# Patient Record
(demographics unavailable — no encounter records)

---

## 2024-12-14 NOTE — HISTORY OF PRESENT ILLNESS
[FreeTextEntry1] : Dec 13, 2024   in person  PCP:  Dr. Elizabeth Russo          Surg:  Dr. Duval Police           Onc:  Dr. Keiry Titus at [facility]  for Hx breast cancer                   GI:  Dr. Genaro Maloney           Card: Dr. Arroyo            CC:  Thyroid nodule - US 9/8/21 sub cm colloid cyst         Severe osteopenia   - on letrazole since 2018                                                 on alendronate weekly since 2019 - now on "holiday" since about [id number] 12/9/2024  T scores:  spine not eligible,    FN -0.4, TH + 0.6, Radius 1/3 -3.0     Spine X-ray:  negative                                                     on calcium and vitamin D         2021:  A1c 6.0    3/23/23:  A1c 6.0  84 yo - eating less and has lost a few pounds.  - concerned b/o Hx of breast cancer . Also notes cycclical water weight gain with sluggishness associated with decrease in hair loss at that time.   Also has some muscle cramps at night.    Last week was at [facility] and told by Dr. Cuello who diagnosed arthritis  of the hips.  Advised physical therapy.    Labs at [place] from 1/8/2024 included TSH 2.78, 25 OH vit D 37; Hct 38.2    A friend of hers advised PM magnesium  for nocturnal cramps.       # Vit D as low as 17.6 in 2/2013.    As of 8/2022 up to 35.   She also takes calcium supplement.  #  Bone density hip 12/2022 excellent.   Forearm T -2.6; however, Z indicates she is above average for her age and     has no history of fracture.   No longer eligible for spine T - but X-ray spine shows no compressions  and previous T    was in good range     # A1c being monitored.    : : Constitutional:  Alert, well nourished, healthy appearance, no acute distress  Eyes:  No proptosis, no stare Thyroid: Pulmonary:  No respiratory distress, no accessory muscle use; normal rate and effort Cardiac:  Normal rate Vascular:  Endocrine:  No stigmata of Cushings Syndrome Musculoskeletal:  Normal gait, no involuntary movements Neurology:  No tremors Affect/Mood/Psych:  Oriented x 3; normal affect, normal insight/judgement, normal mood  . Impression:  Bone density in good range on "holiday" from alendronate - still on letrozole.   May need to go back on the alendronate in the future. She would like to follow up to opthalmology ROV in one year.     Mar 26, 2024    in person  PCP:  Dr. Elizabeth Russo          Surg:  Dr. Simin Jiménez           Onc:  Dr. Keiry Titus at [facility]  for Hx breast cancer                   GI:  Dr. Genaro Maloney           Card: Dr. Arroyo            CC:  Thyroid nodule - US 9/8/21 sub cm colloid cyst         Severe osteopenia   - on letrazole since 2018                                                 on alendronate weekly since 2019 - now on "holiday" since about [id number]                                                     on calcium and vitamin D         2021:  A1c 6.0    3/23/23:  A1c 6.0  83 yo - eating less and has lost a few pounds.  - concerned b/o Hx of breast cancer . Also notes cycclical water weight gain with sluggishness associated with decrease in hair loss at that time.   Also has some muscle cramps at night.    Last week was at [facility] and told by Dr. Cuello who diagnosed arthritis  of the hips.  Advised physical therapy.    Labs at [place] from 1/8/2024 included TSH 2.78, 25 OH vit D 37; Hct 38.2    A friend of hers advised PM magnesium  for nocturnal cramps.       # Vit D as low as 17.6 in 2/2013.    As of 8/2022 up to 35.   She also takes calcium supplement.  #  Bone density hip 12/2022 excellent.   Forearm T -2.6; however, Z indicates she is above average for her age and     has no history of fracture.   No longer eligible for spine T - but X-ray spine shows no compressions  and previous T    was in good range     # A1c being monitored.    Impression:  Doing very well. Plan:  Can be on holiday from alendronate until at least December 2024 bone density and ROV after that.     :    Jun 21, 2023     in person  PCP:  Dr. Elizabeth Russo          Surg:  Dr. Simin Jiménez           Onc:  Tacho at [facility]  for Hx breast cancer                   GI:  Dr. Genaro Maloney           Card: Dr. Arroyo            CC:  Thyroid nodule - US 9/8/21 sub cm colloid cyst         Severe osteopenia   - on letrazole since 2018                                                 on alendronate weekly since 2019 - now on "holiday"                                                   on calcium and vitamin D         2021:  A1c 6.0    3/23/23:  A1c 6.0  83 yo - eating less and has lost a few pounds.  - concerned b/o Hx of breast cancer . Also notes cycclical water weight gain with sluggishness associated with decrease in hair loss at that time.   Also has some muscle cramps at night.      # Vit D as low as 17.6 in 2/2013.    As of 8/2022 up to 35.   She also takes calcium suppplement.  #  Bone density hip 12/2022 excellent.   Forearm T -2.6; however, Z indicates she is above average for her age and     has no history of fracture.   No longer eligible for spine T - but X-ray spine shows no compressions  and previous T    was in good range     # A1c being monitored.   At [place] 3/2023 A1c 6.0 and by 8/2023 5.6%  : : Constitutional:  Alert, well nourished, healthy appearance, no acute distress  Eyes:  No proptosis, no stare Thyroid:  normal to palpation Pulmonary:  No respiratory distress, no accessory muscle use; normal rate and effort Cardiac:  Normal rate Vascular:  Endocrine:  No stigmata of Cushing's Syndrome Musculoskeletal:  Normal gait, no involuntary movements Neurology:  No tremors Affect/Mood/Psych:  Oriented x 3; normal affect, normal insight/judgement, normal mood  .  Imp:  Endocrine isssues all stable. Her chest discomfort resolved after she was evaluated and treated by GI. She is concerned about the interaction of her multiple medications and I did my best to reassure her. She is interested in a second opnion as to how long she should continue on the anti-estrogen and she will seek advice from the [facility] team.    ROV September.      Jan 06, 2023   in person                   #1 BD  ## adrenal rush feeling.    PCP:  Dr. Elizabeth Russo          Surg:  Dr. Simin Jiménez           Onc:  Tacho at [facility]  for Hx breast cancer                   CC:  Thyroid nodule         Severe osteopenia   - on letrazole since 2018                                                 on alendronate weekly since 2019                                                   on calcium and vitamin D         2021:  A1c 6.0  80 yo - eating less and has lost a few pounds.  - concerned b/o Hx of breast cancer . Also notes cycclical water weight gain with sluggishness associated with decrease in hair loss at that time.   Also has some muscle cramps at night.    : : Constitutional:  Alert, well nourished, healthy appearance, no acute distress  Eyes:  No proptosis, no stare Thyroid: Pulmonary:  No respiratory distress, no accessory muscle use; normal rate and effort Cardiac:  Normal rate Vascular:  Endocrine:  No stigmata of Cushing's Syndrome Musculoskeletal:  Normal gait, no involuntary movements Neurology:  No tremors Affect/Mood/Psych:  Oriented x 3; normal affect, normal insight/judgement, normal mood  .  Impression:  Recent bone density 12/8/2022 includes FN T -0.3, TH +0.9, FA -2.6  (but Z +0.8) Although spine no longer included, in 2019 L3 T = 0 and 12/8/22 X-ray spine:  no compressions. she has been on alendronate since 2019 and so she can go "on holiday"" as of today.     Went to [place] and had diarrhea which is resolving. Also has symptoms "like an adrenaline rush" jonnie after she eats, but also when she does not eat. Feels like there is a sponge in her stomach.   She will see GI for their advice.        May 23, 2022    in person     PCP:  Dr. Elizabeth Russo          Surg:  Dr. Duval Police           Onc:  Tacho at [facility]  for Hx breast cancer                   CC:  Thyroid nodule         Severe osteopenia   - on letrazole since 2018                                                 on alendronate weekly since 2019                                                   on calcium and vitamin D         2021:  A1c 6.0  80 yo - eating less and has lost a few pounds.  - concerned b/o Hx of breast cancer . Also notes cycclical water weight gain with sluggishness associated with decrease in hair loss at that time.   Also has some muscle cramps at night.    : : Constitutional:  Alert, well nourished, healthy appearance, no acute distress  Eyes:  No proptosis, no stare Thyroid: Pulmonary:  No respiratory distress, no accessory muscle use; normal rate and effort Cardiac:  Normal rate Vascular:  Endocrine:  No stigmata of Cushing's Syndrome Musculoskeletal:  Normal gait, no involuntary movements Neurology:  No tremors Affect/Mood/Psych:  Oriented x 3; normal affect, normal insight/judgement, normal mood  .    #  Sub Cm thyroid nodule        Advised f/u US thyroid ~ 9/2023  (initially detected by dental technician)       #  Osteopenia - she decided on alendronate     on  letrazole - told she may require indefinitely           She has been back on the alendronate for about 2 years.    If she prefers, she can go on "holiday"  as she indicates she                   may prefer.            Vit D goal is 30-40 and her last vit D was 36.   She will continue to monitor.          She is taking 1200 mg of calcium carbonate (she is lactose intolerant) and she read NYT article suggesting there                may be some risks with too much calcium, it would certain not be a problem if she cut back to 600 mg tabs daily.   #   Reactive hypoglycemia and prediabetes.based on A1c of 6.0%  -              She has long Hx of symptoms of reactive hypoglycemia and has strategies to deal with it.         Plan: Lower carbohydrate diet as she is doing                     She would like to wait tohave labs (A1c, vit D, etc, when she sees Dr. Russo next)      Nov 03, 2021   in person  (note:  has iPad)  PCP:  Dr. Elizabeth Russo          Surg:  Dr. Simin Jiménez           Onc:  Tacho at [facility]    CC:  Thyroid nodule         Severe osteopenia   - on letrazole since 2018                                                 on alendronate weekly since 2019                                                   on calcium and vitamin D         2021:  A1c 6.0  #  Sub Cm thyroid nodule        Will advise f/u US  #  Osteopenia - she decided on alendronate     on  letrazole           She has been back on the alendronate for about 2 years so she can certainly go back on holiday          Vit D goal is 30-40 and her last vit D was 36.   She will continue to monitor.          She is taking 1200 mg of calcium carbonate (she is lactose intolerant) and she read NYT article suggesting there                may be some risks with too much calcium, it would certain not be a problem if she cut back to 600 mg tabs daily.   #   Reactive hypoglycemia and prediabetes.based on A1c of 6.0%  -              She has long Hx of symptoms of reactive hypoglycemia and has strategies to deal with it.         Plan: Lower carbohydrate diet as she is doing                     Plan:  ROV six months.         May 12, 2021    in person  PCP:  Dr. Elizabeth Russo          Surg:  Dr. Simin Jiménez           Onc:  Tacho at [facility]    CC:  Thyroid nodule         Severe osteopenia   - on letrazole since 2018                                                 on alendronate weekly since 2019                                                   on calcium and vitamin D         2021:  A1c 6.0  #  Sub Cm thyroid nodule        Will advise f/u US  #  Osteopenia - she decided on alendronate    #   Reactive hypoglycemia and prediabetes.         Plan: Lower carbohydrate diet          ROV in 3 months.       August 12, 2019  PCP:  Dr. Elizabeth Russo          Surg:  Dr. Simin Jiménez  CC:  Thyroid nodule         Severe osteopenia   77 yo started on anti-estrogen.  Bone density report included forearm which shows severe osteopenia, increased risk of fracture.    Forearm likely included b/o spine was only able to include L3. Oncology at [facility] advised monthly oral Boniva. She is noting more joint pain, particularly in her hands. She has been advised this is likely an effect of the anti-estrogen, but she is concerned it might also be related to the Boniva.  Impression/Plan:   Reviewed National Osteoporosis Foundation Guidelines and the  Oncology Guidelines of 2017 for individuals treated with anti-estrogen. European Guidelines advise antiresorptive if spine or hip T lower than -2.0 or if 2 or more additional risk factors.  Plan:  It seems reasonable to me that she repeat the bone density in one year as suggested in the guidelines.  While the bisphosphonate is likely not the likely culprit for her symptoms, I would certainly have no objection to going on a long holiday from the Boniva to see how that turns out, as her spine and hip bone density are excellent and the forearm only shows osteopenia and a Z score above average.    Can defer the follow up ultrasound of the thyroid for several months. Reassured her that in general her bone strength is excellent and gave her copies of the graphical data and  guidelines.    She will review with her oncologist and I will defer to her.    January 3, 2018  PCP:  Dr. Elizabeth Russo          Surg:  Dr. 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   Her youngest daughter takes thyroid medication.    Summary:  April 8/2013   [facility] There is a 3 mm sized hypoechoic nodudle involving the upper pole of the left lobe.    12/17/2018  [place]   A left upper pole cystic nodule measures 3.9 x 2.8 x 3.3 mm  oerfirned after her dentist noted  December 3, 2013  bone denisty at [place] - excellent   Impression:  Ultrasound is very reassuring. Follow up ultrasound in about 6 months would be prudent.

## 2024-12-14 NOTE — HISTORY OF PRESENT ILLNESS
[FreeTextEntry1] : Dec 13, 2024   in person  PCP:  Dr. Elizabeth Russo          Surg:  Dr. Duval Police           Onc:  Dr. Keiry Titus at [facility]  for Hx breast cancer                   GI:  Dr. Genaro Maloney           Card: Dr. Arroyo            CC:  Thyroid nodule - US 9/8/21 sub cm colloid cyst         Severe osteopenia   - on letrazole since 2018                                                 on alendronate weekly since 2019 - now on "holiday" since about [id number] 12/9/2024  T scores:  spine not eligible,    FN -0.4, TH + 0.6, Radius 1/3 -3.0     Spine X-ray:  negative                                                     on calcium and vitamin D         2021:  A1c 6.0    3/23/23:  A1c 6.0  84 yo - eating less and has lost a few pounds.  - concerned b/o Hx of breast cancer . Also notes cycclical water weight gain with sluggishness associated with decrease in hair loss at that time.   Also has some muscle cramps at night.    Last week was at [facility] and told by Dr. Cuello who diagnosed arthritis  of the hips.  Advised physical therapy.    Labs at [facility] from 1/8/2024 included TSH 2.78, 25 OH vit D 37; Hct 38.2    A friend of hers advised PM magnesium  for nocturnal cramps.       # Vit D as low as 17.6 in 2/2013.    As of 8/2022 up to 35.   She also takes calcium supplement.  #  Bone density hip 12/2022 excellent.   Forearm T -2.6; however, Z indicates she is above average for her age and     has no history of fracture.   No longer eligible for spine T - but X-ray spine shows no compressions  and previous T    was in good range     # A1c being monitored.    : : Constitutional:  Alert, well nourished, healthy appearance, no acute distress  Eyes:  No proptosis, no stare Thyroid: Pulmonary:  No respiratory distress, no accessory muscle use; normal rate and effort Cardiac:  Normal rate Vascular:  Endocrine:  No stigmata of Cushings Syndrome Musculoskeletal:  Normal gait, no involuntary movements Neurology:  No tremors Affect/Mood/Psych:  Oriented x 3; normal affect, normal insight/judgement, normal mood  . Impression:  Bone density in good range on "holiday" from alendronate - still on letrozole.   May need to go back on the alendronate in the future. She would like to follow up to opthalmology ROV in one year.     Mar 26, 2024    in person  PCP:  Dr. Elizabeth Russo          Surg:  Dr. Simin Jiménez           Onc:  Dr. Keiry Titus at [facility]  for Hx breast cancer                   GI:  Dr. Genaro Maloney           Card: Dr. Arroyo            CC:  Thyroid nodule - US 9/8/21 sub cm colloid cyst         Severe osteopenia   - on letrazole since 2018                                                 on alendronate weekly since 2019 - now on "holiday" since about [id number]                                                     on calcium and vitamin D         2021:  A1c 6.0    3/23/23:  A1c 6.0  81 yo - eating less and has lost a few pounds.  - concerned b/o Hx of breast cancer . Also notes cycclical water weight gain with sluggishness associated with decrease in hair loss at that time.   Also has some muscle cramps at night.    Last week was at [facility] and told by Dr. Cuello who diagnosed arthritis  of the hips.  Advised physical therapy.    Labs at [facility] from 1/8/2024 included TSH 2.78, 25 OH vit D 37; Hct 38.2    A friend of hers advised PM magnesium  for nocturnal cramps.       # Vit D as low as 17.6 in 2/2013.    As of 8/2022 up to 35.   She also takes calcium supplement.  #  Bone density hip 12/2022 excellent.   Forearm T -2.6; however, Z indicates she is above average for her age and     has no history of fracture.   No longer eligible for spine T - but X-ray spine shows no compressions  and previous T    was in good range     # A1c being monitored.    Impression:  Doing very well. Plan:  Can be on holiday from alendronate until at least December 2024 bone density and ROV after that.     :    Jun 21, 2023     in person  PCP:  Dr. Elizabeth Russo          Surg:  Dr. Simin Jiménez           Onc:  Tacho at [facility]  for Hx breast cancer                   GI:  Dr. Genaro Maloney           Card: Dr. Arroyo            CC:  Thyroid nodule - US 9/8/21 sub cm colloid cyst         Severe osteopenia   - on letrazole since 2018                                                 on alendronate weekly since 2019 - now on "holiday"                                                   on calcium and vitamin D         2021:  A1c 6.0    3/23/23:  A1c 6.0  81 yo - eating less and has lost a few pounds.  - concerned b/o Hx of breast cancer . Also notes cycclical water weight gain with sluggishness associated with decrease in hair loss at that time.   Also has some muscle cramps at night.      # Vit D as low as 17.6 in 2/2013.    As of 8/2022 up to 35.   She also takes calcium suppplement.  #  Bone density hip 12/2022 excellent.   Forearm T -2.6; however, Z indicates she is above average for her age and     has no history of fracture.   No longer eligible for spine T - but X-ray spine shows no compressions  and previous T    was in good range     # A1c being monitored.   At [facility] 3/2023 A1c 6.0 and by 8/2023 5.6%  : : Constitutional:  Alert, well nourished, healthy appearance, no acute distress  Eyes:  No proptosis, no stare Thyroid:  normal to palpation Pulmonary:  No respiratory distress, no accessory muscle use; normal rate and effort Cardiac:  Normal rate Vascular:  Endocrine:  No stigmata of Cushing's Syndrome Musculoskeletal:  Normal gait, no involuntary movements Neurology:  No tremors Affect/Mood/Psych:  Oriented x 3; normal affect, normal insight/judgement, normal mood  .  Imp:  Endocrine isssues all stable. Her chest discomfort resolved after she was evaluated and treated by GI. She is concerned about the interaction of her multiple medications and I did my best to reassure her. She is interested in a second opnion as to how long she should continue on the anti-estrogen and she will seek advice from the [facility] team.    ROV September.      Jan 06, 2023   in person                   #1 BD  ## adrenal rush feeling.    PCP:  Dr. Elizabeth Russo          Surg:  Dr. Simin Jiménez           Onc:  Tacho at [facility]  for Hx breast cancer                   CC:  Thyroid nodule         Severe osteopenia   - on letrazole since 2018                                                 on alendronate weekly since 2019                                                   on calcium and vitamin D         2021:  A1c 6.0  82 yo - eating less and has lost a few pounds.  - concerned b/o Hx of breast cancer . Also notes cycclical water weight gain with sluggishness associated with decrease in hair loss at that time.   Also has some muscle cramps at night.    : : Constitutional:  Alert, well nourished, healthy appearance, no acute distress  Eyes:  No proptosis, no stare Thyroid: Pulmonary:  No respiratory distress, no accessory muscle use; normal rate and effort Cardiac:  Normal rate Vascular:  Endocrine:  No stigmata of Cushing's Syndrome Musculoskeletal:  Normal gait, no involuntary movements Neurology:  No tremors Affect/Mood/Psych:  Oriented x 3; normal affect, normal insight/judgement, normal mood  .  Impression:  Recent bone density 12/8/2022 includes FN T -0.3, TH +0.9, FA -2.6  (but Z +0.8) Although spine no longer included, in 2019 L3 T = 0 and 12/8/22 X-ray spine:  no compressions. she has been on alendronate since 2019 and so she can go "on holiday"" as of today.     Went to [place] and had diarrhea which is resolving. Also has symptoms "like an adrenaline rush" jonnie after she eats, but also when she does not eat. Feels like there is a sponge in her stomach.   She will see GI for their advice.        May 23, 2022    in person     PCP:  Dr. Elizabeth Russo          Surg:  Dr. Duval Police           Onc:  Tacho at [facility]  for Hx breast cancer                   CC:  Thyroid nodule         Severe osteopenia   - on letrazole since 2018                                                 on alendronate weekly since 2019                                                   on calcium and vitamin D         2021:  A1c 6.0  82 yo - eating less and has lost a few pounds.  - concerned b/o Hx of breast cancer . Also notes cycclical water weight gain with sluggishness associated with decrease in hair loss at that time.   Also has some muscle cramps at night.    : : Constitutional:  Alert, well nourished, healthy appearance, no acute distress  Eyes:  No proptosis, no stare Thyroid: Pulmonary:  No respiratory distress, no accessory muscle use; normal rate and effort Cardiac:  Normal rate Vascular:  Endocrine:  No stigmata of Cushing's Syndrome Musculoskeletal:  Normal gait, no involuntary movements Neurology:  No tremors Affect/Mood/Psych:  Oriented x 3; normal affect, normal insight/judgement, normal mood  .    #  Sub Cm thyroid nodule        Advised f/u US thyroid ~ 9/2023  (initially detected by dental technician)       #  Osteopenia - she decided on alendronate     on  letrazole - told she may require indefinitely           She has been back on the alendronate for about 2 years.    If she prefers, she can go on "holiday"  as she indicates she                   may prefer.            Vit D goal is 30-40 and her last vit D was 36.   She will continue to monitor.          She is taking 1200 mg of calcium carbonate (she is lactose intolerant) and she read NYT article suggesting there                may be some risks with too much calcium, it would certain not be a problem if she cut back to 600 mg tabs daily.   #   Reactive hypoglycemia and prediabetes.based on A1c of 6.0%  -              She has long Hx of symptoms of reactive hypoglycemia and has strategies to deal with it.         Plan: Lower carbohydrate diet as she is doing                     She would like to wait tohave labs (A1c, vit D, etc, when she sees Dr. Russo next)      Nov 03, 2021   in person  (note:  has iPad)  PCP:  Dr. Elizabeth Russo          Surg:  Dr. Simin Jiménez           Onc:  Tacho at [facility]    CC:  Thyroid nodule         Severe osteopenia   - on letrazole since 2018                                                 on alendronate weekly since 2019                                                   on calcium and vitamin D         2021:  A1c 6.0  #  Sub Cm thyroid nodule        Will advise f/u US  #  Osteopenia - she decided on alendronate     on  letrazole           She has been back on the alendronate for about 2 years so she can certainly go back on holiday          Vit D goal is 30-40 and her last vit D was 36.   She will continue to monitor.          She is taking 1200 mg of calcium carbonate (she is lactose intolerant) and she read NYT article suggesting there                may be some risks with too much calcium, it would certain not be a problem if she cut back to 600 mg tabs daily.   #   Reactive hypoglycemia and prediabetes.based on A1c of 6.0%  -              She has long Hx of symptoms of reactive hypoglycemia and has strategies to deal with it.         Plan: Lower carbohydrate diet as she is doing                     Plan:  ROV six months.         May 12, 2021    in person  PCP:  Dr. Elizabeth Russo          Surg:  Dr. Simin Jiménez           Onc:  Tacho at [facility]    CC:  Thyroid nodule         Severe osteopenia   - on letrazole since 2018                                                 on alendronate weekly since 2019                                                   on calcium and vitamin D         2021:  A1c 6.0  #  Sub Cm thyroid nodule        Will advise f/u US  #  Osteopenia - she decided on alendronate    #   Reactive hypoglycemia and prediabetes.         Plan: Lower carbohydrate diet          ROV in 3 months.       August 12, 2019  PCP:  Dr. Elizabeth Russo          Surg:  Dr. Simin Jiménez  CC:  Thyroid nodule         Severe osteopenia   77 yo started on anti-estrogen.  Bone density report included forearm which shows severe osteopenia, increased risk of fracture.    Forearm likely included b/o spine was only able to include L3. Oncology at [facility] advised monthly oral Boniva. She is noting more joint pain, particularly in her hands. She has been advised this is likely an effect of the anti-estrogen, but she is concerned it might also be related to the Boniva.  Impression/Plan:   Reviewed National Osteoporosis Foundation Guidelines and the  Oncology Guidelines of 2017 for individuals treated with anti-estrogen. European Guidelines advise antiresorptive if spine or hip T lower than -2.0 or if 2 or more additional risk factors.  Plan:  It seems reasonable to me that she repeat the bone density in one year as suggested in the guidelines.  While the bisphosphonate is likely not the likely culprit for her symptoms, I would certainly have no objection to going on a long holiday from the Boniva to see how that turns out, as her spine and hip bone density are excellent and the forearm only shows osteopenia and a Z score above average.    Can defer the follow up ultrasound of the thyroid for several months. Reassured her that in general her bone strength is excellent and gave her copies of the graphical data and  guidelines.    She will review with her oncologist and I will defer to her.    January 3, 2018  PCP:  Dr. Elizabeth Russo          Surg:  Dr. 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   Her youngest daughter takes thyroid medication.    Summary:  April 8/2013   NSLI There is a 3 mm sized hypoechoic nodudle involving the upper pole of the left lobe.    12/17/2018  [facility]   A left upper pole cystic nodule measures 3.9 x 2.8 x 3.3 mm  oerfirned after her dentist noted  December 3, 2013  bone denisty at [facility] - excellent   Impression:  Ultrasound is very reassuring. Follow up ultrasound in about 6 months would be prudent.

## 2025-01-08 NOTE — PHYSICAL EXAM
[Normal S1, S2] : normal S1 and S2 [Murmurs] : no murmurs [Heart Rate And Rhythm] : heart rate was normal and rhythm regular [Edema] : edema was not present [Abdomen Tenderness] : non-tender [No Masses] : no abdominal mass palpated [Abdomen Soft] : soft [No Hernias] : no hernia was discovered [Normal] : no focal deficits [de-identified] : rt mastctomy - with errythema- skin [de-identified] : keratoses

## 2025-01-08 NOTE — HISTORY OF PRESENT ILLNESS
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 urogyn scheduled ct scan with contrast  she declined now    scheduled for cystoscopy and ultrasound kidneys and bladder  seeing Dr hall suggested letrozole for 10 years  5/21/24 pt has infection in eye - wonders if related to ear oflloxin tobramycin and dexamethasone does swim in pool has antibiotic drops for rt eye topical steroids  march infected toe nails  saw   dr baldwin - getting another     endoscopy in July on omeprazole  saw dr lopez - on amlodpine 110 mg  7/30/24 saw Dr hall - yesterday - no changes made signatera  - neg test for cancer labs were normal had f/up barretts esphagus - just had endoscopy neg biopies omeprazoled qod  covid in June no paxlovid mild  ears and eyes clear   1/8/25 pt had flu and covid vaccines off omperprazole  had labs with dr hellerman vit d 26/3 - stopped vit D - will restart labs otherwise normal  notes edema ankles  end of day supp hose helps not now

## 2025-01-08 NOTE — ASSESSMENT
[FreeTextEntry1] : cont norvasc for hypertension - under control  w/up neuropathy neg incl B12, B1, Immunofixation hba1c 5.9 - pt aware of prediabetes  breast continue as above with femara  ordered us of thyroid  10/15/19 back on boniva - holding it for a month did not help bone pains gave prevnar left arm reviewed labs gave copy ekg normal covid test  20 prevnar given - [id number]  DT  needs second shingles vaccines      is considering fosamax instead increased vit D to two tabs off dairy venous insuff - supp hose checking vit already had flu vaccine 45 mins counseling re above  10/27/20 advance directives reviewed dexa labs reviewed had a mammo had flu vaccine got pneuominia this summer and both   21 pt has some chest pain - vitals normal it is reproducible ekg completely [place] will get chest xray suspect costochondirits will try advil  8/3/21 left thigh pain - will get xray cont PT and advil - likely muscle strain  getting US thryoid f/up mammo  DTP  22 will get chest ct to f/up 6 mmLLL nodule - seen incidently pt notes increased SOB on exertion - however has stopped gym   of lung ca and she is worried no cough, no sputum  labs - reviewed - copy given to pt all wnl   22 nodule stable one year labs are all normal neuropsych tests were normal feels less stamina order mammo GYN appears normal  23  unusual abd discomfort  - seeing dr barker to get abd CT and endoscopy weight is stable  cardiology - some unusual left cheset discomfort saw Dr Diana echo wnl getting EST   had pain in calves after walking a lot in [place] - better with PT  wants new onc  - to avoid trips to [place] given names  sty in left eye - getting better with soaks sees Dr Barroso now for glaucome  anxious re possibly moving from apt anxious re computer - hard to see all the notes referred her to help desk for pt access to notes  24 ear  clearned of cezar audiologist referral  interested in test for lactose intolerance  stools irreg - diet has changed   has seen GI for endoscopy has seen    onc f/up breast ca  BP is good  25 minimal LE edema - reassured lipids good  probs with speech she notesproblems with words with Ts and Ss askes for speech therapy  optho f/up  derm

## 2025-02-06 NOTE — ASSESSMENT
[FreeTextEntry1] : # multifocal infiltrating lobular carcinoma range 0.15-5.2cm moderately differentiated grade 6/9 ER 90% MS 10% HER2 0. 0.5cm focus of infiltrating ductal carcinoma well differentiated 4/9 ER 95%, MS 5% HER 2 negative 6/38 LN + extracapsular extension no LVI LCIS classic type no DCIS ADH present  s/p R mastectomy and ALND Patient was recommended for chemo and declined and then started following at [facility] Natasha Leos  s/p adjuvant RT  Started Letrozole 9/2018 At this point I do not know if there is any benefit to starting verzenio and she would like to come off medication not add to it.  Currently asymptomatic except for SI joint pain which comes and goes - she will be seeing ortho for this - [facility] bone and joint. If no imaging is obtained and this persists will check imaging. ALKP wnl. seeing physiatrist now  signaterra negative in 2/24 and 7/2024 (confirmed with signaterra rep Doreen)  Advised to continue letrozole for 10 years. If able to tolerate and bones allow.  she was previously on bisphosphonate therapy and stopped per endocrinologist. h/o R mastectomy - s/p L side mammo [id number] BIRADS 2 - has small L axillary LN check US  #osteoporosis start of it in the forearm follows with Dr. Hellerman will resume prolia/zometa if next bone density worsens.  12/24 - Dexa and was told that she did not need to make any changes, saw Dr. Hellerman 12/2024   #hair thinning tsh wnl, vitD wnl check iron, ferritin  RTC in 6 months with cbc with diff, cmp, mag, phos, LDH, ESR/CRP, iron, ferritin, signaterra

## 2025-02-06 NOTE — ASSESSMENT
[FreeTextEntry1] : # multifocal infiltrating lobular carcinoma range 0.15-5.2cm moderately differentiated grade 6/9 ER 90% TN 10% HER2 0. 0.5cm focus of infiltrating ductal carcinoma well differentiated 4/9 ER 95%, TN 5% HER 2 negative 6/38 LN + extracapsular extension no LVI LCIS classic type no DCIS ADH present  s/p R mastectomy and ALND Patient was recommended for chemo and declined and then started following at [facility] Natasha Leos  s/p adjuvant RT  Started Letrozole 9/2018 At this point I do not know if there is any benefit to starting verzenio and she would like to come off medication not add to it.  Currently asymptomatic except for SI joint pain which comes and goes - she will be seeing ortho for this - [facility] bone [facility] joint. If no imaging is obtained and this persists will check imaging. ALKP wnl. seeing physiatrist now  signaterra negative in 2/24 and 7/2024 (confirmed with signaterra rep Doreen)  Advised to continue letrozole for 10 years. If able to tolerate and bones allow.  she was previously on bisphosphonate therapy and stopped per endocrinologist. h/o R mastectomy - s/p L side mammo 10/[id number] BIRADS 2 - has small L axillary LN check US  #osteoporosis start of it in the forearm follows with Dr. Hellerman will resume prolia/zometa if next bone density worsens.  12/24 - Dexa and was told that she did not need to make any changes, saw Dr. Hellerman 12/2024   #hair thinning tsh wnl, vitD wnl check iron, ferritin  RTC in 6 months with cbc with diff, cmp, mag, phos, LDH, ESR/CRP, iron, ferritin, signaterra

## 2025-02-06 NOTE — PHYSICAL EXAM
[Fully active, able to carry on all pre-disease performance without restriction] : Status 0 - Fully active, able to carry on all pre-disease performance without restriction [Normal] : affect appropriate [de-identified] : R mastectomy, no masses small L axillary LN

## 2025-02-06 NOTE — CONSULT LETTER
[Dear  ___] : Dear  [unfilled], [Consult Letter:] : I had the pleasure of evaluating your patient, [unfilled]. [Please see my note below.] : Please see my note below. [Consult Closing:] : Thank you very much for allowing me to participate in the care of this patient.  If you have any questions, please do not hesitate to contact me. [Sincerely,] : Sincerely, [FreeTextEntry3] : Keiry Nagel DO, FACMAYCOL, FACP Medical Oncology and Hematology [facility]

## 2025-02-06 NOTE — PHYSICAL EXAM
[Fully active, able to carry on all pre-disease performance without restriction] : Status 0 - Fully active, able to carry on all pre-disease performance without restriction [Normal] : affect appropriate [de-identified] : R mastectomy, no masses small L axillary LN

## 2025-02-06 NOTE — HISTORY OF PRESENT ILLNESS
[de-identified] : Ms. Brooks is an 83 y/o Ashkenazi female with a PMhx of R breast cancer s/p mastectomy, HTN, asthma, GERD, HLD, that presents for initial consultation as transfer of care from Dr. Titus at [facility].   Oncological History:   She was initially seen here by Dr. Jiménez following abnormal mammo and complaints of shrinking R breast   She noticed A shrinking right breast for 2 years with her right nipple turning downwards.   She had a recent mammogram and US that showed a 4.5 cm architectural distortion that was biopsied and found to be ILC ER70% PR40% [id number] neg and Ki67 <10%.   2018 s/p R mastectomy with R axillary dissection at [facility] with Dr. Ethan Key revealing multifocal infiltrating lobular carcinoma range 0.15-5.2cm moderately differentiated grade 6/ ER 90% VA 10% HER2 0. 0.5cm focus of infiltrating ductal carcinoma well differentiated  ER 95%, VA 5% HER 2 negative  LN + extracapsular extension no LVI LCIS classic type no DCIS ADH present   Patient was recommended for chemo and declined and then started following at [facility] Dr. Leos   s/p adjuvant RT   Started Letrozole 2018  s/p L sided screening mammo 10/2023 BIRADS 2   EGD Dr. Maloney    Allergies  None   Meds  Timolol eye drops  letrozole  amlodipine ASA 81  Atorvastatin  Omeprazole Citracal  Biotin  Vitamin d   FamHx  Alzhemiers Mother  DM Father, PGF Cancer MGM  Breast Cancer daughter   Social Hx  Former smoker smoked for 10 years less than 1ppd quit   no ETOH  Retired professor   Breast Cancer Risk Factors: Menarche: does not recall Date of LMP: does not recall Menopause: does not recall  Age at first live birth: 29 Nursed: yes Hysterectomy: no Oophorectomy: no OCP: yes  HRT: yes  Last pap/pelvic exam: never abnl pap Dr. Mcdonald  Related family history daughter breast ca at age 43 Ashkenazi: yes BRCA testing: negative states she was tested in 2018  [de-identified] : patient seen and examined for routine follow up. Continues to follow with Dr. Russo. Saw Dr. Leone 8/2024. s/p breast imaging 10/2024 BIRADS 2. [facility] 7/2024 negative (confirmed with [facility] rep). Remains on letrozole. Seeing physiatrist for knee arthritis.

## 2025-02-06 NOTE — HISTORY OF PRESENT ILLNESS
[de-identified] : Ms. Brooks is an 81 y/o Ashkenazi female with a PMhx of R breast cancer s/p mastectomy, HTN, asthma, GERD, HLD, that presents for initial consultation as transfer of care from Dr. Titus at [facility].   Oncological History:   She was initially seen here by Dr. Jiménez following abnormal mammo and complaints of shrinking R breast   She noticed A shrinking right breast for 2 years with her right nipple turning downwards.   She had a recent mammogram and US that showed a 4.5 cm architectural distortion that was biopsied and found to be ILC ER70% PR40% [id number] neg and Ki67 <10%.   2018 s/p R mastectomy with R axillary dissection at [facility] with Dr. Ethan Key revealing multifocal infiltrating lobular carcinoma range 0.15-5.2cm moderately differentiated grade 6/ ER 90% IA 10% HER2 0. 0.5cm focus of infiltrating ductal carcinoma well differentiated  ER 95%, IA 5% HER 2 negative  LN + extracapsular extension no LVI LCIS classic type no DCIS ADH present   Patient was recommended for chemo and declined and then started following at [facility] Dr. Leos   s/p adjuvant RT   Started Letrozole 2018  s/p L sided screening mammo 10/2023 BIRADS 2   EGD Dr. Maloney    Allergies  None   Meds  Timolol eye drops  letrozole  amlodipine ASA 81  Atorvastatin  Omeprazole Citracal  Biotin  Vitamin d   FamHx  Alzhemiers Mother  DM Father, PGF Cancer MGM  Breast Cancer daughter   Social Hx  Former smoker smoked for 10 years less than 1ppd quit   no ETOH  Retired professor   Breast Cancer Risk Factors: Menarche: does not recall Date of LMP: does not recall Menopause: does not recall  Age at first live birth: 29 Nursed: yes Hysterectomy: no Oophorectomy: no OCP: yes  HRT: yes  Last pap/pelvic exam: never abnl pap Dr. Mcdonald  Related family history daughter breast ca at age 43 Ashkenazi: yes BRCA testing: negative states she was tested in 2018  [de-identified] : patient seen and examined for routine follow up. Continues to follow with Dr. Russo. Saw Dr. Leone 8/2024. s/p breast imaging 10/2024 BIRADS 2. [facility] 7/2024 negative (confirmed with [facility] rep). Remains on letrozole. Seeing physiatrist for knee arthritis.

## 2025-02-06 NOTE — CONSULT LETTER
[Dear  ___] : Dear  [unfilled], [Consult Letter:] : I had the pleasure of evaluating your patient, [unfilled]. [Please see my note below.] : Please see my note below. [Consult Closing:] : Thank you very much for allowing me to participate in the care of this patient.  If you have any questions, please do not hesitate to contact me. [Sincerely,] : Sincerely, [FreeTextEntry3] : Keiry Nagel DO, FACMAYCOL, FACP Medical Oncology and Hematology [facility]

## 2025-02-27 NOTE — HISTORY OF PRESENT ILLNESS
[FreeTextEntry1] : Doing well overall, but has right knee bursitis -. due to see physiatrist for gel injection (has had cortisone  No CP, SOB Able [place]

## 2025-02-27 NOTE — REASON FOR VISIT
[Symptom and Test Evaluation] : symptom and test evaluation [Hypertension] : hypertension [Coronary Artery Disease] : coronary artery disease [FreeTextEntry3] : Dr Russo

## 2025-02-27 NOTE — REVIEW OF SYSTEMS
[Feeling Fatigued] : feeling fatigued [Weight Loss (___ Lbs)] : [unfilled] ~Ulb weight loss [Joint Pain] : joint pain [Negative] : Heme/Lymph [FreeTextEntry5] : see HPI [FreeTextEntry7] : gas

## 2025-02-27 NOTE — HISTORY OF PRESENT ILLNESS
[FreeTextEntry1] : Doing well overall, but has right knee bursitis -. due to see physiatrist for gel injection (has had cortisone  No CP, SOB Able to walk [place]

## 2025-02-27 NOTE — RESULTS/DATA
[TextEntry] : EKG shows sinus rhythm at 75, frequent PVCs, rare APCs (compared to 8/23/2024, PVCs and APC are seen

## 2025-07-08 NOTE — REVIEW OF SYSTEMS
[Incontinence] : incontinence [Joint Stiffness] : joint stiffness [Negative] : Psychiatric [FreeTextEntry9] : right knee

## 2025-07-08 NOTE — ASSESSMENT
[FreeTextEntry1] : cont norvasc for hypertension - under control  w/up neuropathy neg incl B12, B1, Immunofixation hba1c 5.9 - pt aware of prediabetes  breast continue as above with femara  ordered us of thyroid  10/15/19 back on boniva - holding it for a month did not help bone pains gave prevnar left arm reviewed labs gave copy ekg normal covid test  20 prevnar given - [id number]  DT  needs second shingles vaccines      is considering fosamax instead increased vit D to two tabs off dairy venous insuff - supp hose checking vit already had flu vaccine 45 mins counseling re above  10/27/20 advance directives reviewed dexa labs reviewed had a mammo had flu vaccine got pneuominia this summer and both   21 pt has some chest pain - vitals normal it is reproducible ekg completely [place] will get chest xray suspect costochondirits will try advil  8/3/21 left thigh pain - will get xray cont PT and advil - likely muscle strain  getting US thryoid f/up mammo  DTP  22 will get chest ct to f/up 6 mmLLL nodule - seen incidently pt notes increased SOB on exertion - however has stopped gym   of lung ca and she is worried no cough, no sputum  labs - reviewed - copy given to pt all wnl   22 nodule stable one year labs are all normal neuropsych tests were normal feels less stamina order mammo GYN appears normal  23  unusual abd discomfort  - seeing dr barker to get abd CT and endoscopy weight is stable  cardiology - some unusual left cheset discomfort saw Dr Diana echo wnl getting EST   had pain in calves after walking a lot in [place] - better with PT  wants new onc  - to avoid trips to [place] given names  sty in left eye - getting better with soaks sees Dr Barroso now for glaucome  anxious re possibly moving from apt anxious re computer - hard to see all the notes referred her to help desk for pt access to notes  24 ear  clearned of cezar audiologist referral  interested in test for lactose intolerance  stools irreg - diet has changed   has seen GI for endoscopy has seen    onc f/up breast ca  BP is good  25 minimal LE edema - reassured lipids good  probs with speech she notesproblems with words with Ts and Ss askes for speech therapy  optho f/up  derm  25 BP good follows at [facility] for rt knee - considering   surgery troubled with night time incontinence -    to see urogyn again followed by Dr Barroso

## 2025-07-08 NOTE — PHYSICAL EXAM
[Normal S1, S2] : normal S1 and S2 [Murmurs] : no murmurs [Heart Rate And Rhythm] : heart rate was normal and rhythm regular [Edema] : edema was not present [Abdomen Tenderness] : non-tender [No Masses] : no abdominal mass palpated [Abdomen Soft] : soft [No Hernias] : no hernia was discovered [Normal] : normal affect and normal mood [de-identified] : rt mastctomy - with errythema- skin [de-identified] : rt knee sl swollen [de-identified] : keratoses

## 2025-07-08 NOTE — HISTORY OF PRESENT ILLNESS
[FreeTextEntry1] : pt had some chest pain has had complete evaluation since last visit sw Dr lopez - has CAD diagnosed on cardiac  angiogram Dr Genaro Maloney did endoscopy on new meds now could not tolerate beta blocker - caused brain fog on omeprazole 20      reduced from 40 mg  had labs 7/27/23 - all wnl lipids good  vertigo in am better after water etc   1/30/24 pt was sick nov - jan with viruses including covid  - treated with paxlovid dr lopez - on increased amlodipine on statin since July will check lipids getting PT for pelvic floor - for incontinence saw Dr Familia romero has rbcs in urine since 1980s urogyn scheduled ct scan with contrast  she declined now    scheduled for cystoscopy and ultrasound kidneys and bladder  seeing Dr hall suggested letrozole for 10 years  5/21/24 pt has infection in eye - wonders if related to ear oflloxin tobramycin and dexamethasone does swim in pool has antibiotic drops for rt eye topical steroids  march infected toe nails  saw   dr baldwin - getting another     endoscopy in July on omeprazole  saw dr lopez - on amlodpine 110 mg  7/30/24 saw Dr hall - yesterday - no changes made [facility]  - neg test for cancer labs were normal had f/up barretts esphagus - just had endoscopy neg biopies omeprazoled qod  covid in June no paxlovid mild  ears and eyes clear   1/8/25 pt had flu and covid vaccines off omperprazole  had labs with dr hellerman vit d 26/3 - stopped vit D - will restart labs otherwise normal  notes edema ankles  end of day supp hose helps not now  7/8/25 in Feb arthritis bothering her Dr Costa had cortisone and PT for knee went to psyiatrist at [facility] - three injections hyauronic acid knee - rt gets PT and goes to [facility] no more cane labs wnl hb 11.7 rare use of albuterol  become slightly incontinent at night went to urogyn - karuna collins cystocele had pelvic floor therapy right leg does not move fast enough